# Patient Record
Sex: FEMALE | Race: WHITE
[De-identification: names, ages, dates, MRNs, and addresses within clinical notes are randomized per-mention and may not be internally consistent; named-entity substitution may affect disease eponyms.]

---

## 2018-06-07 ENCOUNTER — HOSPITAL ENCOUNTER (EMERGENCY)
Dept: HOSPITAL 80 - FED | Age: 43
Discharge: HOME | End: 2018-06-07
Payer: COMMERCIAL

## 2018-06-07 VITALS — SYSTOLIC BLOOD PRESSURE: 120 MMHG | DIASTOLIC BLOOD PRESSURE: 84 MMHG

## 2018-06-07 DIAGNOSIS — J45.909: ICD-10-CM
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
## 2018-06-07 DIAGNOSIS — S62.317A: Primary | ICD-10-CM

## 2018-06-07 DIAGNOSIS — F17.200: ICD-10-CM
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
## 2018-06-07 DIAGNOSIS — Z23: ICD-10-CM

## 2018-06-07 NOTE — EDPHY
H & P


Time Seen by Provider: 06/07/18 13:56


HPI/ROS: 


CHIEF COMPLAINT:  Left hand injury





HISTORY OF PRESENT ILLNESS:  42-year-old female presents to the emergency 

department with left hand injury.  The patient [place] and 

tripped over a rock and fell landing on her left side and then she got up and 

apparently fell again hitting her right side of her face injuring her left 

hand.  She is right-hand dominant.  The incident happened last night.  She did 

not lose consciousness.  Denies a headache.  Denies neck or back pain.  Denies 

chest pain or difficulty breathing.  Denies abdominal pain or vomiting.





REVIEW OF SYSTEMS:


Constitutional:  No fever, no chills.


Eyes:  No double or blurry vision.


ENT:  No sore throat.


Respiratory:  No cough, no shortness of breath.


Cardiac:  No chest pain.


Gastrointestinal:  No abdominal pain, vomiting or diarrhea.


Genitourinary:  No dysuria.


Musculoskeletal:  Left hand injury.  No neck or back pain.


Skin:  Abrasions.  No rashes.


Neurological:  No headache.


Past Medical/Surgical History: 





Hysterectomy, osteoporosis, fibrocystic breast disease, asthma, chronic 

bronchitis, bipolar, severe depression, PTSD, fibromyalgia


Social History: 





 and lives in [place]


Smoking Status: Current every day smoker


Physical Exam: 





General Appearance:  Alert, no distress.  Mentating normally and answering 

questions appropriately.  Superficial abrasion just superior to the right 

eyelid.


Eyes:  Pupils equal and round.  Extraocular motions are all intact.


ENT:  Mouth:  Mucous membranes moist.


Respiratory:  No wheezing, rhonchi, or rales, lungs are clear to auscultation.


Cardiovascular:  Regular rate and rhythm.


Gastrointestinal:  Abdomen is soft and nontender, no masses, no rebound or 

guarding, bowel sounds normal.


Neurological:  Alert and oriented x 3, cranial nerves II through XII grossly 

intact


Skin:  Superficial healing abrasions to the anterior lateral aspect of the left 

shoulder, lateral aspect of her left cheek.  Very superficial abrasion to the 

right shoulder.  There also superficial abrasions noted to left palm more so 

over the metacarpal heads.  No evidence of open fracture.  Warm and dry, no 

rashes.


Musculoskeletal:  Nontender to palpate along the cervical, thoracic or lumbar 

spine.  Neck is supple.


Extremities:  Swelling noted to her left hand.  Reproducible pain with 

palpation over the metacarpal.  There is also ecchymosis noted to the palm of 

the left hand.  No rotational deformities noted.  Normal sensation to light 

touch with normal 2 point discrimination.  The other fingers do not appear 

injured.  Full range of motion of her left wrist and left elbow and left 

shoulder.  Full range of motion of her right upper extremities and lower 

extremities bilaterally.


Psychiatric:  Patient is oriented X 3, there is no agitation.


Constitutional: 


 Initial Vital Signs











Temperature (C)  36.9 C   06/07/18 12:56


 


Heart Rate  91   06/07/18 12:56


 


Respiratory Rate  16   06/07/18 12:56


 


Blood Pressure  100/74   06/07/18 12:56


 


O2 Sat (%)  92   06/07/18 12:56








 











O2 Delivery Mode               Room Air














Allergies/Adverse Reactions: 


 





No Known Allergies Allergy (Unverified 06/07/18 12:54)


 








Home Medications: 














 Medication  Instructions  Recorded


 


Advair 500/50 (*)  06/07/18


 


Albuterol  06/07/18


 


Gabapentin  06/07/18


 


Ibuprofen 600 mg PO TID PRN #30 tablet 06/07/18


 


Lamotrigine  06/07/18


 


Metaxalone  06/07/18


 


OXcarbazepine  06/07/18


 


Omeprazole  06/07/18


 


Xanax  06/07/18














Medical Decision Making





- Diagnostics


Imaging Results: 


 Imaging Impressions





Hand X-Ray  06/07/18 13:30


Impression: Impacted angulated and displaced fracture at the base of the fifth 

metacarpal. Fracture involves the articular surface.











Imaging: I viewed and interpreted images myself


Procedures: 





Patient was placed in ulnar gutter fiberglass splint and examined post 

application in good placement with normal CNS.


ED Course/Re-evaluation: 





42-year-old female presents to the emergency department with left hand injury.  

X-rays reveal fracture to the base of the 5th metacarpal.  Patient was placed 

in Ortho Glass splint.  She was given orthopedic referral and will follow up 

tomorrow or Monday to recheck.  She understands that she will likely require 

surgical repair.  The no evidence of open fracture on examination.


Differential Diagnosis: 





Including but not limited to fracture, dislocation, contusion, sprain





- Data Points


Medications Given: 


 








Discontinued Medications





Diphtheria/Tetanus/Acell Pertussis (Boostrix)  0.5 ml IM .ONCE ONE


   Stop: 06/07/18 14:07


   Last Admin: 06/07/18 14:17 Dose:  0.5 ml


Ibuprofen (Motrin)  600 mg PO EDNOW ONE


   Stop: 06/07/18 14:40


   Last Admin: 06/07/18 14:47 Dose:  600 mg








Departure





- Departure


Disposition: Home, Routine, Self-Care


Clinical Impression: 


Fracture of fifth metacarpal bone of left hand


Qualifiers:


 Encounter type: initial encounter Fracture type: closed Metacarpal location: 

base Fracture alignment: displaced Qualified Code(s): S62.317A - Displaced 

fracture of base of fifth metacarpal bone, left hand, initial encounter for 

closed fracture





Condition: Good


Instructions:  Hand Fracture (ED)


Additional Instructions: 


Keep splint on and keep it dry.  Ice and elevate to help reduce swelling.  

Ibuprofen 600 mg every 8 hr as needed for pain.


Referrals: 


Neeraj Sharma MD [Medical Doctor] - As per Instructions (Orthopedic hand 

surgeon on-call)


Prescriptions: 


Ibuprofen 600 mg PO TID PRN #30 tablet


 PRN Reason: P.r.n. Pain

## 2018-07-10 ENCOUNTER — HOSPITAL ENCOUNTER (EMERGENCY)
Dept: HOSPITAL 80 - FED | Age: 43
Discharge: HOME | End: 2018-07-10
Payer: COMMERCIAL

## 2018-07-10 VITALS — DIASTOLIC BLOOD PRESSURE: 72 MMHG | SYSTOLIC BLOOD PRESSURE: 123 MMHG

## 2018-07-10 DIAGNOSIS — F17.200: ICD-10-CM

## 2018-07-10 DIAGNOSIS — J44.1: ICD-10-CM

## 2018-07-10 DIAGNOSIS — J18.9: Primary | ICD-10-CM

## 2018-07-10 LAB
INR PPP: 1.02 (ref 0.83–1.16)
PLATELET # BLD: 258 10^3/UL (ref 150–400)
PROTHROMBIN TIME: 13.6 SEC (ref 12–15)

## 2018-07-10 PROCEDURE — 96375 TX/PRO/DX INJ NEW DRUG ADDON: CPT

## 2018-07-10 PROCEDURE — 99285 EMERGENCY DEPT VISIT HI MDM: CPT

## 2018-07-10 PROCEDURE — 96374 THER/PROPH/DIAG INJ IV PUSH: CPT

## 2018-07-10 PROCEDURE — 93005 ELECTROCARDIOGRAM TRACING: CPT

## 2018-07-10 PROCEDURE — 71046 X-RAY EXAM CHEST 2 VIEWS: CPT

## 2018-07-10 NOTE — CPEKG
Heart Rate: 90

RR Interval: 667

P-R Interval: 144

QRSD Interval: 84

QT Interval: 372

QTC Interval: 455

P Axis: 77

QRS Axis: 63

T Wave Axis: 50

EKG Severity - NORMAL ECG -

EKG Impression: SINUS RHYTHM

Electronically Signed By: Virgilio Menjivar 11-Jul-2018 06:54:38

## 2018-07-10 NOTE — EDPHY
H & P


Time Seen by Provider: 07/10/18 08:24


HPI/ROS: 





CHIEF COMPLAINT:  Shortness of breath





HISTORY OF PRESENT ILLNESS:  The patient is a 42-year-old smoker with a history 

of COPD who reports increasing shortness of breath and cough productive of 

yellow sputum over the last few days.  She also reports fever of 100 yesterday 

at home.  She ran out of her albuterol this morning.  She called EMS who gave 

her a DuoNeb and placed on 4 L nasal cannula.  They state that she was 

saturating 80% when they arrived.  She denies cardiac history.  She states that 

her symptoms worsened over the last 2 days.





REVIEW OF SYSTEMS:


Constitutional:  denies: chills, fever, recent illness, recent injury


EENTM: denies: blurred vision, double vision, nose congestion


Respiratory:  See HPI


Cardiac: denies: chest pain, irregular heart rate, lightheadedness, palpitations


Gastrointestinal/Abdominal: denies: abdominal pain, diarrhea, nausea, vomiting, 

blood streaked stools


Genitourinary: denies: dysuria, frequency, hematuria, pain


Musculoskeletal: denies: joint pain, muscle pain


Skin: denies: lesions, rash, jaundice, bruising


Neurological: denies: headache, numbness, paresthesia, tingling, dizziness, 

weakness


Hematologic/Lymphatic: denies: blood clots, easy bleeding, easy bruising


Immunologic/allergic: denies: HIV/AIDS, transplant








EXAM:


GENERAL:  Well-appearing, well-nourished and in moderate distress.


HEAD:  Atraumatic, normocephalic.


EYES:  Pupils equal round and reactive to light, extraocular movements intact, 

sclera anicteric, conjunctiva are normal.


ENT:  TMs normal, nares patent, oropharynx clear without exudates.  Moist 

mucous membranes.


NECK:  Normal range of motion, supple without lymphadenopathy or JVD.


LUNGS:  Bilateral expiratory wheezes and rhonchi


HEART:  Regular rate and rhythm without murmurs, rubs or gallops.


ABDOMEN:  Soft, nontender, normoactive bowel sounds.  No guarding, no rebound.  

No masses appreciated.


BACK:  No CVA tenderness, no spinal tenderness, step-offs or deformities


EXTREMITIES:  Normal range of motion, no pitting or edema.  No clubbing or 

cyanosis.


NEUROLOGICAL:  Cranial nerves II through XII grossly intact.  Normal speech, 

normal gait.  5/5 strength, normal movement in all extremities, normal sensation


PSYCH:  Normal mood, normal affect.


SKIN:  Warm, dry, normal turgor, no visible rashes or lesions.








Source: Patient, EMS


Exam Limitations: No limitations





- Medical/Surgical History


Hx Asthma: Yes


Hx Chronic Respiratory Disease: Yes


Hx Diabetes: No


Hx Cardiac Disease: No


Hx Renal Disease: No


Hx Cirrhosis: No


Hx Alcoholism: No


Hx HIV/AIDS: No


Hx Splenectomy or Spleen Trauma: No


Other PMH: hysterectomy, osteoporosis, fibrocystic breast disease, asthma, 

chronic bronchitis, bipolar, severe depression, PTSD, fibromyalgia





- Family History


Significant Family History: No pertinent family hx





- Social History


Smoking Status: Current every day smoker


Alcohol Use: Sober


Constitutional: 


 Initial Vital Signs











Temperature (C)  37.5 C   07/10/18 08:18


 


Heart Rate  100   07/10/18 08:18


 


Respiratory Rate  24 H  07/10/18 08:18


 


Blood Pressure  126/81 H  07/10/18 08:18


 


O2 Sat (%)  87 L  07/10/18 08:18








 











O2 Delivery Mode               Room Air


 


O2 (L/minute)                  3














Allergies/Adverse Reactions: 


 





No Known Allergies Allergy (Unverified 07/10/18 08:27)


 








Home Medications: 














 Medication  Instructions  Recorded


 


Advair 500/50 (*)  06/07/18


 


Albuterol  06/07/18


 


Gabapentin  06/07/18


 


Ibuprofen 600 mg PO TID PRN #30 tablet 06/07/18


 


Lamotrigine  06/07/18


 


Metaxalone  06/07/18


 


OXcarbazepine  06/07/18


 


Omeprazole  06/07/18


 


Xanax  06/07/18


 


Albuterol [Proventil Neb] 3 ml IH QID PRN #30 deyvial 07/10/18


 


Benadryl  07/10/18


 


Sennosides/Docusate Sodium  07/10/18





[Senna-S Tablet]  


 


levOFLOXACIN [levAQUIN] 750 mg PO DAILY #10 tab 07/10/18


 


predniSONE 60 mg PO DAILY #15 tab 07/10/18














Medical Decision Making





- Diagnostics


EKG Interpretation: 





An EKG obtained and was read and documented in trace view.  Please see trace 

view for full reading and report.  Sinus rhythm no acute ischemic changes 


Imaging Results: 


 Imaging Impressions





Chest X-Ray  07/10/18 08:25


Impression: Bilateral pneumonia, worse in the right middle lobe.


 


Findings and recommendations discussed with Emergency Department physician, 

Rolly Ackerman at  9:35 hour, 7/10/2018.


 


Final report concurs with initial preliminary interpretation.











Imaging: Discussed imaging studies w/ On call Radiologist


ED Course/Re-evaluation: 





10:25 a.m. I offered admission for pneumonia and COPD exacerbation however the 

patient is very motivated to go home.  She is saturating 92% on room air.  She 

promises to return if her symptoms worsen.  She is requesting a prescription 

for albuterol nebulizer ampules.  Will also give her prescription for 

prednisone and Levaquin.  She is happy with this and declines further 

observation or treatment.


Differential Diagnosis: 





Partial list of the Differential diagnosis considered include but were not 

limited to;  pneumonia, COPD exacerbation, and although unlikely based on the 

history and physical exam, I also considered pneumothorax, PE, acute coronary 

disease, CHF.  I discussed these differential diagnoses and the plan with the 

patient as well as the usual and expected course.  The patient understands that 

the diagnosis is provisional and that in medicine we are not always correct and 

that further workup is often warranted.  Usual and customary warnings were 

given.  All of the patient's questions were answered.  The patient was 

instructed to return to the emergency department should the symptoms at all 

worsen or return, otherwise to followup with the physician as we discussed.





- Data Points


Laboratory Results: 


 Laboratory Results





 07/10/18 08:30 





 07/10/18 08:30 





 











  07/10/18 07/10/18 07/10/18





  08:30 08:30 08:30


 


WBC      





    


 


RBC      





    


 


Hgb      





    


 


Hct      





    


 


MCV      





    


 


MCH      





    


 


MCHC      





    


 


RDW      





    


 


Plt Count      





    


 


MPV      





    


 


Neut % (Auto)      





    


 


Lymph % (Auto)      





    


 


Mono % (Auto)      





    


 


Eos % (Auto)      





    


 


Baso % (Auto)      





    


 


Nucleat RBC Rel Count      





    


 


Absolute Neuts (auto)      





    


 


Absolute Lymphs (auto)      





    


 


Absolute Monos (auto)      





    


 


Absolute Eos (auto)      





    


 


Absolute Basos (auto)      





    


 


Absolute Nucleated RBC      





    


 


Immature Gran %      





    


 


Immature Gran #      





    


 


PT      13.6 SEC SEC





     (12.0-15.0) 


 


INR      1.02 





     (0.83-1.16) 


 


APTT      45.7 SEC H SEC





     (23.0-38.0) 


 


VBG Lactic Acid  0.9 mmol/L mmol/L    





   (0.7-2.1)   


 


Sodium    129 mEq/L L mEq/L  





    (135-145)  


 


Potassium    3.7 mEq/L mEq/L  





    (3.3-5.0)  


 


Chloride    91 mEq/L L mEq/L  





    ()  


 


Carbon Dioxide    23 mEq/l mEq/l  





    (22-31)  


 


Anion Gap    15 mEq/L mEq/L  





    (8-16)  


 


BUN    10 mg/dL mg/dL  





    (7-23)  


 


Creatinine    0.6 mg/dL mg/dL  





    (0.6-1.0)  


 


Estimated GFR    > 60   





    


 


Glucose    105 mg/dL H mg/dL  





    ()  


 


Calcium    9.0 mg/dL mg/dL  





    (8.5-10.4)  


 


Total Bilirubin    0.5 mg/dL mg/dL  





    (0.1-1.4)  














  07/10/18





  08:30


 


WBC  11.92 10^3/uL H 10^3/uL





   (3.80-9.50) 


 


RBC  3.98 10^6/uL L 10^6/uL





   (4.18-5.33) 


 


Hgb  12.6 g/dL g/dL





   (12.6-16.3) 


 


Hct  34.4 % L %





   (38.0-47.0) 


 


MCV  86.4 fL fL





   (81.5-99.8) 


 


MCH  31.7 pg pg





   (27.9-34.1) 


 


MCHC  36.6 g/dL g/dL





   (32.4-36.7) 


 


RDW  12.6 % %





   (11.5-15.2) 


 


Plt Count  258 10^3/uL 10^3/uL





   (150-400) 


 


MPV  8.7 fL fL





   (8.7-11.7) 


 


Neut % (Auto)  83.4 % H %





   (39.3-74.2) 


 


Lymph % (Auto)  9.1 % L %





   (15.0-45.0) 


 


Mono % (Auto)  6.5 % %





   (4.5-13.0) 


 


Eos % (Auto)  0.2 % L %





   (0.6-7.6) 


 


Baso % (Auto)  0.2 % L %





   (0.3-1.7) 


 


Nucleat RBC Rel Count  0.0 % %





   (0.0-0.2) 


 


Absolute Neuts (auto)  9.95 10^3/uL H 10^3/uL





   (1.70-6.50) 


 


Absolute Lymphs (auto)  1.08 10^3/uL 10^3/uL





   (1.00-3.00) 


 


Absolute Monos (auto)  0.78 10^3/uL 10^3/uL





   (0.30-0.80) 


 


Absolute Eos (auto)  0.02 10^3/uL L 10^3/uL





   (0.03-0.40) 


 


Absolute Basos (auto)  0.02 10^3/uL 10^3/uL





   (0.02-0.10) 


 


Absolute Nucleated RBC  0.00 10^3/uL 10^3/uL





   (0-0.01) 


 


Immature Gran %  0.6 % %





   (0.0-1.1) 


 


Immature Gran #  0.07 10^3/uL 10^3/uL





   (0.00-0.10) 


 


PT  





  


 


INR  





  


 


APTT  





  


 


VBG Lactic Acid  





  


 


Sodium  





  


 


Potassium  





  


 


Chloride  





  


 


Carbon Dioxide  





  


 


Anion Gap  





  


 


BUN  





  


 


Creatinine  





  


 


Estimated GFR  





  


 


Glucose  





  


 


Calcium  





  


 


Total Bilirubin  





  











Microbiology Results: 


 MICROBIOLOGY





07/10/18 09:30   Nasal, Sinus - Swab   Respiratory Panel (PCR) - Final


                            Human Rhinovirus/Enterovirus





Medications Given: 


 








Discontinued Medications





Albuterol (Proventil Neb)  3 ml IH EDNOW ONE


   Stop: 07/10/18 09:09


   Last Admin: 07/10/18 09:10 Dose:  3 ml


Albuterol/Ipratropium (Duoneb)  3 ml IH EDNOW ONE


   Stop: 07/10/18 08:25


   Last Admin: 07/10/18 08:32 Dose:  3 ml


Levofloxacin/Dextrose (Levaquin 750 Mg (Premix))  150 mls @ 100 mls/hr IV EDNOW 

ONE


   PRN Reason: Protocol


   Stop: 07/10/18 09:53


   Last Admin: 07/10/18 08:58 Dose:  150 mls


Methylprednisolone Sodium Succinate (Solu-Medrol)  125 mg IVP EDNOW ONE


   Stop: 07/10/18 08:25


   Last Admin: 07/10/18 08:42 Dose:  125 mg








Departure





- Departure


Disposition: Home, Routine, Self-Care


Clinical Impression: 


 Chronic obstructive pulmonary disease with acute exacerbation





Pneumonia


Qualifiers:


 Pneumonia type: due to unspecified organism Laterality: bilateral Lung location

: unspecified part of lung Qualified Code(s): J18.9 - Pneumonia, unspecified 

organism





Condition: Fair


Instructions:  COPD (Chronic Obstructive Pulmonary Disease) (ED), Bacterial 

Pneumonia (ED)


Referrals: 


Patient,NotPresent [Unknown] - As per Instructions


Rudolph Canales MD [[facility] Primary Care Provider] - As per Instructions


Prescriptions: 


Albuterol [Proventil Neb] 3 ml IH QID PRN #30 deyvial


 PRN Reason: Short Of Breath/Dyspnea


levOFLOXACIN [levAQUIN] 750 mg PO DAILY #10 tab


predniSONE 60 mg PO DAILY #15 tab

## 2019-03-25 ENCOUNTER — HOSPITAL ENCOUNTER (OUTPATIENT)
Dept: HOSPITAL 80 - BMCIMAGING | Age: 44
End: 2019-03-25
Attending: NURSE PRACTITIONER
Payer: COMMERCIAL

## 2019-03-25 DIAGNOSIS — Z13.820: Primary | ICD-10-CM

## 2019-03-25 DIAGNOSIS — M81.0: ICD-10-CM
